# Patient Record
Sex: FEMALE | URBAN - METROPOLITAN AREA
[De-identification: names, ages, dates, MRNs, and addresses within clinical notes are randomized per-mention and may not be internally consistent; named-entity substitution may affect disease eponyms.]

---

## 2017-10-06 ENCOUNTER — CHARTING TRANS (OUTPATIENT)
Dept: URGENT CARE | Age: 22
End: 2017-10-06

## 2017-10-06 ASSESSMENT — PAIN SCALES - GENERAL: PAINLEVEL_OUTOF10: 4

## 2017-12-21 PROCEDURE — 87086 URINE CULTURE/COLONY COUNT: CPT | Performed by: FAMILY MEDICINE

## 2018-01-19 LAB
AMB EXT ANTIBODY SCREEN: NEGATIVE
AMB EXT CHLAMYDIA, NUCLEIC ACID AMP: NOT DETECTED
AMB EXT CYSFIB RESULT: NEGATIVE
AMB EXT GONOCOCCUS, NUCLEIC ACID AMP: NOT DETECTED
AMB EXT HBSAG SCREEN: NON REACTIVE
AMB EXT HEMATOCRIT: 34.9
AMB EXT HEMOGLOBIN: 11.9
AMB EXT HIV AG AB COMBO: NON REACTIVE
AMB EXT MCV: 92.3
AMB EXT PLATELETS: 273
AMB EXT RH FACTOR: POSITIVE
AMB EXT SICKLE CELL SOLUBILITY: NEGATIVE
AMB EXT THINPREP PAP: NEGATIVE
AMB EXT TREPONEMAL ANTIBODIES: NON REACTIVE
AMB EXT URINE CULTURE ROUTINE: NO GROWTH

## 2018-03-09 PROBLEM — M54.50 ACUTE BILATERAL LOW BACK PAIN WITHOUT SCIATICA: Status: ACTIVE | Noted: 2018-03-09

## 2018-05-26 LAB
AMB EXT GLUCOSE 1HR OB: 85
AMB EXT HEMATOCRIT: 36.3
AMB EXT HEMOGLOBIN: 12.7
AMB EXT PLATELETS: 258

## 2018-07-19 ENCOUNTER — TELEPHONE (OUTPATIENT)
Dept: OBGYN CLINIC | Facility: CLINIC | Age: 23
End: 2018-07-19

## 2018-07-19 NOTE — TELEPHONE ENCOUNTER
Spoke w/ pt & offered meet & greet appt w/ BR. Pt explained she was w/ cnm at [facility] when she was transferred to [facility] but was told she could come back to the practice. When she called to say she was returning, they told her they could not accept her.  Pt

## 2018-07-27 ENCOUNTER — OFFICE VISIT (OUTPATIENT)
Dept: OBGYN CLINIC | Facility: CLINIC | Age: 23
End: 2018-07-27
Payer: COMMERCIAL

## 2018-07-27 ENCOUNTER — APPOINTMENT (OUTPATIENT)
Dept: LAB | Facility: HOSPITAL | Age: 23
End: 2018-07-27
Attending: ADVANCED PRACTICE MIDWIFE
Payer: COMMERCIAL

## 2018-07-27 ENCOUNTER — NURSE ONLY (OUTPATIENT)
Dept: OBGYN CLINIC | Facility: CLINIC | Age: 23
End: 2018-07-27
Payer: COMMERCIAL

## 2018-07-27 VITALS — WEIGHT: 173.13 LBS | HEIGHT: 62.25 IN | BODY MASS INDEX: 31.46 KG/M2

## 2018-07-27 VITALS — HEART RATE: 67 BPM | DIASTOLIC BLOOD PRESSURE: 69 MMHG | SYSTOLIC BLOOD PRESSURE: 105 MMHG

## 2018-07-27 DIAGNOSIS — Z71.89 PRENATAL CONSULT: ICD-10-CM

## 2018-07-27 DIAGNOSIS — Z3A.35 35 WEEKS GESTATION OF PREGNANCY: ICD-10-CM

## 2018-07-27 DIAGNOSIS — Z3A.35 35 WEEKS GESTATION OF PREGNANCY: Primary | ICD-10-CM

## 2018-07-27 DIAGNOSIS — Z34.03 ENCOUNTER FOR SUPERVISION OF NORMAL FIRST PREGNANCY IN THIRD TRIMESTER: Primary | ICD-10-CM

## 2018-07-27 LAB
FOLATE SERPL-MCNC: >24 NG/ML
MULTISTIX LOT#: NORMAL NUMERIC
PH, URINE: 7.5 (ref 4.5–8)
SPECIFIC GRAVITY: 1.01 (ref 1–1.03)
URINE-COLOR: YELLOW
UROBILINOGEN,SEMI-QN: 0.2 MG/DL (ref 0–1.9)
VIT B12 SERPL-MCNC: 294 PG/ML (ref 181–914)

## 2018-07-27 PROCEDURE — 86778 TOXOPLASMA ANTIBODY IGM: CPT

## 2018-07-27 PROCEDURE — 86803 HEPATITIS C AB TEST: CPT

## 2018-07-27 PROCEDURE — 81002 URINALYSIS NONAUTO W/O SCOPE: CPT | Performed by: ADVANCED PRACTICE MIDWIFE

## 2018-07-27 PROCEDURE — 87389 HIV-1 AG W/HIV-1&-2 AB AG IA: CPT

## 2018-07-27 PROCEDURE — 36415 COLL VENOUS BLD VENIPUNCTURE: CPT

## 2018-07-27 PROCEDURE — 82746 ASSAY OF FOLIC ACID SERUM: CPT

## 2018-07-27 PROCEDURE — 82607 VITAMIN B-12: CPT

## 2018-07-27 PROCEDURE — 86780 TREPONEMA PALLIDUM: CPT

## 2018-07-27 PROCEDURE — 86777 TOXOPLASMA ANTIBODY: CPT

## 2018-07-27 PROCEDURE — 86787 VARICELLA-ZOSTER ANTIBODY: CPT

## 2018-07-27 NOTE — PROGRESS NOTES
Pt is a Transfer of Care at 35.4 weeks. NOB Education complete and information given to pt. Pt is vegetarian, she is unsure if she ever had chicken pox or if she had the immunization, pt has a cat but does not change the litter box.   Folate and B12, Vari

## 2018-07-28 NOTE — PROGRESS NOTES
HPI:    Patient ID: Paxton Hope is a 20year old female presents for prenatal consult. Was with [facility] [facility] [employer] [facility] then moved to [place].  Plan all along had been to transfer back to them but when she called midwife had left and they could no longer

## 2018-07-29 LAB
TOXOPLASMA GONDII AB, IGG: <3 IU/ML
TOXOPLASMA GONDII AB, IGM: <3 AU/ML

## 2018-07-30 ENCOUNTER — INITIAL PRENATAL (OUTPATIENT)
Dept: OBGYN CLINIC | Facility: CLINIC | Age: 23
End: 2018-07-30
Payer: COMMERCIAL

## 2018-07-30 ENCOUNTER — TELEPHONE (OUTPATIENT)
Dept: OBGYN CLINIC | Facility: CLINIC | Age: 23
End: 2018-07-30

## 2018-07-30 VITALS
DIASTOLIC BLOOD PRESSURE: 73 MMHG | SYSTOLIC BLOOD PRESSURE: 108 MMHG | HEART RATE: 80 BPM | WEIGHT: 174.63 LBS | BODY MASS INDEX: 32 KG/M2

## 2018-07-30 DIAGNOSIS — Z34.03 ENCOUNTER FOR SUPERVISION OF NORMAL FIRST PREGNANCY IN THIRD TRIMESTER: Primary | ICD-10-CM

## 2018-07-30 LAB
APPEARANCE: CLEAR
HCV AB SERPL QL IA: NONREACTIVE
HIV1+2 AB SERPL QL IA: NONREACTIVE
MULTISTIX LOT#: NORMAL NUMERIC
PH, URINE: 6.5 (ref 4.5–8)
SPECIFIC GRAVITY: 1.02 (ref 1–1.03)
T PALLIDUM AB SER QL: NEGATIVE
URINE-COLOR: YELLOW
UROBILINOGEN,SEMI-QN: 0.2 MG/DL (ref 0–1.9)
VZV IGG SER IA-ACNC: 147 (ref 165–?)

## 2018-07-30 PROCEDURE — 81002 URINALYSIS NONAUTO W/O SCOPE: CPT | Performed by: ADVANCED PRACTICE MIDWIFE

## 2018-07-30 NOTE — TELEPHONE ENCOUNTER
----- Message from Brian Heath CNM sent at 7/30/2018 11:31 AM CDT -----  Please notify of normal labs, negative GBS

## 2018-07-30 NOTE — TELEPHONE ENCOUNTER
Spoke with pt and advised of normal lab results and negative GBS. Pt agreed and voiced understanding.

## 2018-07-30 NOTE — PROGRESS NOTES
VIRGILIO at 35 weeks. Feeling well, active baby. Hoping for water birth, reviewed study. Yes to vitamin K, no EES. Thinking about saline lock, would like to avoid. Yes to circumcision.   Still looking for peds in [facility], Cindy Dennis while in hospital.  N

## 2018-07-31 LAB
C TRACH DNA SPEC QL NAA+PROBE: NEGATIVE
N GONORRHOEA DNA SPEC QL NAA+PROBE: NEGATIVE

## 2018-08-06 ENCOUNTER — ROUTINE PRENATAL (OUTPATIENT)
Dept: OBGYN CLINIC | Facility: CLINIC | Age: 23
End: 2018-08-06
Payer: COMMERCIAL

## 2018-08-06 VITALS
HEART RATE: 69 BPM | DIASTOLIC BLOOD PRESSURE: 72 MMHG | SYSTOLIC BLOOD PRESSURE: 113 MMHG | BODY MASS INDEX: 32 KG/M2 | WEIGHT: 176 LBS

## 2018-08-06 DIAGNOSIS — Z34.03 ENCOUNTER FOR SUPERVISION OF NORMAL FIRST PREGNANCY IN THIRD TRIMESTER: Primary | ICD-10-CM

## 2018-08-06 LAB
APPEARANCE: CLEAR
MULTISTIX LOT#: NORMAL NUMERIC
PH, URINE: 7 (ref 4.5–8)
SPECIFIC GRAVITY: 1.02 (ref 1–1.03)
URINE-COLOR: YELLOW
UROBILINOGEN,SEMI-QN: 0 MG/DL (ref 0–1.9)

## 2018-08-06 PROCEDURE — 81002 URINALYSIS NONAUTO W/O SCOPE: CPT | Performed by: ADVANCED PRACTICE MIDWIFE

## 2018-08-06 NOTE — PROGRESS NOTES
Active baby. No signs of labor. Reviewed danger signs and CNM contact info. Uncertain about hepatitis B vaccine - encouraged.

## 2018-08-14 ENCOUNTER — ROUTINE PRENATAL (OUTPATIENT)
Dept: OBGYN CLINIC | Facility: CLINIC | Age: 23
End: 2018-08-14
Payer: COMMERCIAL

## 2018-08-14 ENCOUNTER — HOSPITAL ENCOUNTER (INPATIENT)
Facility: HOSPITAL | Age: 23
LOS: 3 days | Discharge: HOME | End: 2018-08-17
Attending: ADVANCED PRACTICE MIDWIFE | Admitting: OBSTETRICS & GYNECOLOGY
Payer: COMMERCIAL

## 2018-08-14 VITALS
SYSTOLIC BLOOD PRESSURE: 117 MMHG | BODY MASS INDEX: 33 KG/M2 | WEIGHT: 180.63 LBS | DIASTOLIC BLOOD PRESSURE: 77 MMHG | HEART RATE: 80 BPM

## 2018-08-14 DIAGNOSIS — Z34.03 ENCOUNTER FOR SUPERVISION OF NORMAL FIRST PREGNANCY IN THIRD TRIMESTER: Primary | ICD-10-CM

## 2018-08-14 PROBLEM — O42.90 PROM (PREMATURE RUPTURE OF MEMBRANES): Status: ACTIVE | Noted: 2018-08-14

## 2018-08-14 PROBLEM — O42.90 ROM (RUPTURE OF MEMBRANES), PREMATURE: Status: ACTIVE | Noted: 2018-08-14

## 2018-08-14 LAB
ANTIBODY SCREEN: NEGATIVE
APPEARANCE: CLEAR
ERYTHROCYTE [DISTWIDTH] IN BLOOD BY AUTOMATED COUNT: 13.1 % (ref 11–15)
HCT VFR BLD AUTO: 36.5 % (ref 35–48)
HGB BLD-MCNC: 12.4 G/DL (ref 12–16)
MCH RBC QN AUTO: 31.8 PG (ref 27–32)
MCHC RBC AUTO-ENTMCNC: 34.1 G/DL (ref 32–37)
MCV RBC AUTO: 93.3 FL (ref 80–100)
MULTISTIX LOT#: NORMAL NUMERIC
PH, URINE: 7.5 (ref 4.5–8)
PLATELET # BLD AUTO: 236 K/UL (ref 140–400)
PMV BLD AUTO: 9 FL (ref 7.4–10.3)
RBC # BLD AUTO: 3.91 M/UL (ref 3.7–5.4)
RH BLOOD TYPE: POSITIVE
SPECIFIC GRAVITY: 1.01 (ref 1–1.03)
URINE-COLOR: YELLOW
UROBILINOGEN,SEMI-QN: 0.2 MG/DL (ref 0–1.9)
WBC # BLD AUTO: 13.7 K/UL (ref 4–11)

## 2018-08-14 PROCEDURE — 81002 URINALYSIS NONAUTO W/O SCOPE: CPT | Performed by: ADVANCED PRACTICE MIDWIFE

## 2018-08-14 PROCEDURE — 59425 ANTEPARTUM CARE ONLY: CPT | Performed by: ADVANCED PRACTICE MIDWIFE

## 2018-08-14 RX ORDER — LIDOCAINE HYDROCHLORIDE 10 MG/ML
30 INJECTION, SOLUTION EPIDURAL; INFILTRATION; INTRACAUDAL; PERINEURAL ONCE
Status: COMPLETED | OUTPATIENT
Start: 2018-08-14 | End: 2018-08-15

## 2018-08-14 RX ORDER — SODIUM CHLORIDE 0.9 % (FLUSH) 0.9 %
10 SYRINGE (ML) INJECTION AS NEEDED
Status: DISCONTINUED | OUTPATIENT
Start: 2018-08-14 | End: 2018-08-15

## 2018-08-14 RX ORDER — AMMONIA INHALANTS 0.04 G/.3ML
0.3 INHALANT RESPIRATORY (INHALATION) AS NEEDED
Status: DISCONTINUED | OUTPATIENT
Start: 2018-08-14 | End: 2018-08-15

## 2018-08-14 RX ORDER — DEXTROSE, SODIUM CHLORIDE, SODIUM LACTATE, POTASSIUM CHLORIDE, AND CALCIUM CHLORIDE 5; .6; .31; .03; .02 G/100ML; G/100ML; G/100ML; G/100ML; G/100ML
INJECTION, SOLUTION INTRAVENOUS
Status: COMPLETED
Start: 2018-08-14 | End: 2018-08-14

## 2018-08-14 RX ORDER — DEXTROSE, SODIUM CHLORIDE, SODIUM LACTATE, POTASSIUM CHLORIDE, AND CALCIUM CHLORIDE 5; .6; .31; .03; .02 G/100ML; G/100ML; G/100ML; G/100ML; G/100ML
125 INJECTION, SOLUTION INTRAVENOUS CONTINUOUS
Status: DISCONTINUED | OUTPATIENT
Start: 2018-08-14 | End: 2018-08-15

## 2018-08-14 RX ORDER — TRISODIUM CITRATE DIHYDRATE AND CITRIC ACID MONOHYDRATE 500; 334 MG/5ML; MG/5ML
30 SOLUTION ORAL AS NEEDED
Status: DISCONTINUED | OUTPATIENT
Start: 2018-08-14 | End: 2018-08-15

## 2018-08-14 RX ORDER — TERBUTALINE SULFATE 1 MG/ML
0.25 INJECTION, SOLUTION SUBCUTANEOUS AS NEEDED
Status: DISCONTINUED | OUTPATIENT
Start: 2018-08-14 | End: 2018-08-15

## 2018-08-14 RX ORDER — IBUPROFEN 600 MG/1
600 TABLET ORAL ONCE AS NEEDED
Status: DISCONTINUED | OUTPATIENT
Start: 2018-08-14 | End: 2018-08-15

## 2018-08-14 NOTE — H&P
[facility] Patient Status:  Inpatient    1995 MRN [id number]   Location [address] [place] [address] Attending Aaron Anne, [address] Day # 0 PCP Nancy Rivers MD Tab Take by mouth.  Disp:  Rfl:  8/13/2018 at 0900       Review of Systems:   As documented in HPI        Physical Exam:   Temp:  [98.1 °F (36.7 °C)] 98.1 °F (36.7 °C)  Pulse:  [77-97] 77  Resp:  [16] 16  BP: (108-118)/(69-78) 118/71    Constitutional: aler All questions answered, all appropriate consents will be signed at the Hospital. Admission is planned for today. Intervention: IV Pitocin induction.     Dalila Hawkins  8/14/2018  3:28 PM

## 2018-08-15 ENCOUNTER — ANESTHESIA (OUTPATIENT)
Dept: OBGYN UNIT | Facility: HOSPITAL | Age: 23
End: 2018-08-15
Payer: COMMERCIAL

## 2018-08-15 ENCOUNTER — ANESTHESIA EVENT (OUTPATIENT)
Dept: OBGYN UNIT | Facility: HOSPITAL | Age: 23
End: 2018-08-15
Payer: COMMERCIAL

## 2018-08-15 PROBLEM — O41.1230 CHORIOAMNIONITIS IN THIRD TRIMESTER: Status: ACTIVE | Noted: 2018-08-15

## 2018-08-15 PROCEDURE — 4A1H74Z MONITORING OF PRODUCTS OF CONCEPTION, CARDIAC ELECTRICAL ACTIVITY, VIA NATURAL OR ARTIFICIAL OPENING: ICD-10-PCS | Performed by: ADVANCED PRACTICE MIDWIFE

## 2018-08-15 PROCEDURE — 10H07YZ INSERTION OF OTHER DEVICE INTO PRODUCTS OF CONCEPTION, VIA NATURAL OR ARTIFICIAL OPENING: ICD-10-PCS | Performed by: ADVANCED PRACTICE MIDWIFE

## 2018-08-15 PROCEDURE — 59410 OBSTETRICAL CARE: CPT | Performed by: ADVANCED PRACTICE MIDWIFE

## 2018-08-15 PROCEDURE — 0HQ9XZZ REPAIR PERINEUM SKIN, EXTERNAL APPROACH: ICD-10-PCS | Performed by: ADVANCED PRACTICE MIDWIFE

## 2018-08-15 RX ORDER — BISACODYL 10 MG
10 SUPPOSITORY, RECTAL RECTAL ONCE AS NEEDED
Status: DISCONTINUED | OUTPATIENT
Start: 2018-08-15 | End: 2018-08-18

## 2018-08-15 RX ORDER — LIDOCAINE HYDROCHLORIDE 10 MG/ML
INJECTION, SOLUTION EPIDURAL; INFILTRATION; INTRACAUDAL; PERINEURAL
Status: DISPENSED
Start: 2018-08-15 | End: 2018-08-16

## 2018-08-15 RX ORDER — AMMONIA INHALANTS 0.04 G/.3ML
0.3 INHALANT RESPIRATORY (INHALATION) AS NEEDED
Status: DISCONTINUED | OUTPATIENT
Start: 2018-08-15 | End: 2018-08-18

## 2018-08-15 RX ORDER — DOCUSATE SODIUM 100 MG/1
100 CAPSULE, LIQUID FILLED ORAL 2 TIMES DAILY
Status: DISCONTINUED | OUTPATIENT
Start: 2018-08-15 | End: 2018-08-18

## 2018-08-15 RX ORDER — BUPIVACAINE HYDROCHLORIDE 2.5 MG/ML
INJECTION, SOLUTION EPIDURAL; INFILTRATION; INTRACAUDAL
Status: COMPLETED
Start: 2018-08-15 | End: 2018-08-15

## 2018-08-15 RX ORDER — ONDANSETRON 2 MG/ML
4 INJECTION INTRAMUSCULAR; INTRAVENOUS EVERY 6 HOURS PRN
Status: DISCONTINUED | OUTPATIENT
Start: 2018-08-15 | End: 2018-08-18

## 2018-08-15 RX ORDER — IBUPROFEN 200 MG
400 TABLET ORAL EVERY 4 HOURS PRN
Status: DISCONTINUED | OUTPATIENT
Start: 2018-08-15 | End: 2018-08-18

## 2018-08-15 RX ORDER — 0.9 % SODIUM CHLORIDE 0.9 %
VIAL (ML) INJECTION
Status: DISCONTINUED
Start: 2018-08-15 | End: 2018-08-15 | Stop reason: WASHOUT

## 2018-08-15 RX ORDER — SODIUM CHLORIDE, SODIUM LACTATE, POTASSIUM CHLORIDE, CALCIUM CHLORIDE 600; 310; 30; 20 MG/100ML; MG/100ML; MG/100ML; MG/100ML
INJECTION, SOLUTION INTRAVENOUS
Status: COMPLETED
Start: 2018-08-15 | End: 2018-08-15

## 2018-08-15 RX ORDER — GENTAMICIN SULFATE 80 MG/50ML
1.5 INJECTION, SOLUTION INTRAVENOUS EVERY 8 HOURS
Status: DISCONTINUED | OUTPATIENT
Start: 2018-08-15 | End: 2018-08-15

## 2018-08-15 RX ORDER — ONDANSETRON 2 MG/ML
4 INJECTION INTRAMUSCULAR; INTRAVENOUS ONCE
Status: COMPLETED | OUTPATIENT
Start: 2018-08-15 | End: 2018-08-15

## 2018-08-15 RX ORDER — NALBUPHINE HCL 10 MG/ML
2.5 AMPUL (ML) INJECTION
Status: DISCONTINUED | OUTPATIENT
Start: 2018-08-15 | End: 2018-08-15

## 2018-08-15 RX ORDER — IBUPROFEN 200 MG
200 TABLET ORAL EVERY 4 HOURS PRN
Status: DISCONTINUED | OUTPATIENT
Start: 2018-08-15 | End: 2018-08-18

## 2018-08-15 RX ORDER — ONDANSETRON 2 MG/ML
INJECTION INTRAMUSCULAR; INTRAVENOUS
Status: DISPENSED
Start: 2018-08-15 | End: 2018-08-16

## 2018-08-15 RX ORDER — EPHEDRINE SULFATE/0.9% NACL/PF 25 MG/5 ML
SYRINGE (ML) INTRAVENOUS
Status: DISCONTINUED
Start: 2018-08-15 | End: 2018-08-15 | Stop reason: WASHOUT

## 2018-08-15 RX ORDER — ACETAMINOPHEN 325 MG/1
650 TABLET ORAL ONCE
Status: COMPLETED | OUTPATIENT
Start: 2018-08-15 | End: 2018-08-15

## 2018-08-15 RX ORDER — PRENATAL VIT,CAL 76/IRON/FOLIC 29 MG-1 MG
1 TABLET ORAL DAILY
Status: DISCONTINUED | OUTPATIENT
Start: 2018-08-16 | End: 2018-08-18

## 2018-08-15 RX ORDER — BUPIVACAINE HYDROCHLORIDE 2.5 MG/ML
INJECTION, SOLUTION EPIDURAL; INFILTRATION; INTRACAUDAL AS NEEDED
Status: DISCONTINUED | OUTPATIENT
Start: 2018-08-15 | End: 2018-08-15 | Stop reason: SURG

## 2018-08-15 RX ORDER — IBUPROFEN 600 MG/1
600 TABLET ORAL EVERY 4 HOURS PRN
Status: DISCONTINUED | OUTPATIENT
Start: 2018-08-15 | End: 2018-08-18

## 2018-08-15 RX ORDER — SIMETHICONE 80 MG
80 TABLET,CHEWABLE ORAL 3 TIMES DAILY PRN
Status: DISCONTINUED | OUTPATIENT
Start: 2018-08-15 | End: 2018-08-18

## 2018-08-15 RX ORDER — DIAPER,BRIEF,INFANT-TODD,DISP
1 EACH MISCELLANEOUS EVERY 6 HOURS PRN
Status: DISCONTINUED | OUTPATIENT
Start: 2018-08-15 | End: 2018-08-18

## 2018-08-15 RX ORDER — EPHEDRINE SULFATE/0.9% NACL/PF 25 MG/5 ML
5 SYRINGE (ML) INTRAVENOUS AS NEEDED
Status: DISCONTINUED | OUTPATIENT
Start: 2018-08-15 | End: 2018-08-15

## 2018-08-15 RX ADMIN — BUPIVACAINE HYDROCHLORIDE 10 ML: 2.5 INJECTION, SOLUTION EPIDURAL; INFILTRATION; INTRACAUDAL at 16:04:00

## 2018-08-15 NOTE — PROGRESS NOTES
[facility]    Labor Progress Note    Ursula Dey Patient Status:  Inpatient    1995 MRN [id number]   Location [address]  Attending Leah Cast, [address] Day # 1 PCP Kelly Cedeno MD

## 2018-08-15 NOTE — PROGRESS NOTES
[place] [facility]      Labor Progress Note    Bryson Smith Patient Status:  Inpatient    1995 MRN [id number]   Location [address]  Attending Sada Early, [address] Day # 1 PCP Luz Maria Chicas MD

## 2018-08-15 NOTE — ANESTHESIA PROCEDURE NOTES
Labor Analgesia  Performed by: Yesenia Gaffney  Authorized by: Yesenia Gaffney     Patient Location:  OB  Start Time:  8/15/2018 3:55 PM  End Time:  8/15/2018 4:05 PM  Site Identification: surface landmarks    Anesthesiologist:  Yesenia Gaffney

## 2018-08-15 NOTE — PROGRESS NOTES
[facility]    Labor Progress Note    Dulcy Bring Patient Status:  Inpatient    1995 MRN [id number]   Location [address] [facility] Attending Terry Mary, [address] Day # 1 PCP Lia Palomo MD understanding and agreement.         Mony Goldsmith  8/15/2018

## 2018-08-15 NOTE — PROGRESS NOTES
Reported feeling leaking fluid when got up this morning, soaked underwear. No further fluid since. Had intercourse last night. Denies bleeding or cramping.     SSE: pooling watery mucous, +nitrazine, - ferning x1, to [facility] now for amniosure test.  VALENTÍN coleman

## 2018-08-15 NOTE — PROGRESS NOTES
[facility]      Labor Progress Note    Bryson Smith Patient Status:  Inpatient    1995 MRN [id number]   Location [address]  Attending Sada Early, [address] Day # 1 PCP Luz Maria Chicas MD

## 2018-08-15 NOTE — PROGRESS NOTES
Received bedside report from Francia Morrison RN. Pt. In shower, intermittent monitor, with saline lock intact.

## 2018-08-15 NOTE — PROGRESS NOTES
[facility]    Labor Progress Note    Maxine Abernathy Patient Status:  Inpatient    1995 MRN [id number]   Location [address] [facility] Attending Tomer Montalvo, [address] Day # 1 PCP Aurora Gonzalez MD understanding and agreement.         Carrol Small  8/15/2018

## 2018-08-15 NOTE — ANESTHESIA PREPROCEDURE EVALUATION
Anesthesia PreOp Note    HPI:     Lupe Ramos is a 20year old female who presents for preoperative consultation requested by: * No surgeons listed *    Date of Surgery: 8/15/2018    * No procedures listed *  Indication: * No pre-op diagnosis entered Rate: 125 mL/hr at 08/15/18 0225 125 mL/hr at 08/15/18 0225   Lidocaine HCl (PF) (XYLOCAINE) 1 % injection SOLN 30 mL 30 mL Intradermal Once Raj Woods CNM     ibuprofen (MOTRIN) tab 600 mg 600 mg Oral Once PRN Raj Woods CNM     oxyTOC 12.4 08/14/2018   HGB 12.7 05/26/2018   HCT 36.5 08/14/2018   HCT 36.3 05/26/2018   MCV 93.3 08/14/2018   MCH 31.8 08/14/2018   MCHC 34.1 08/14/2018   RDW 13.1 08/14/2018    08/14/2018    05/26/2018   MPV 9.0 08/14/2018             Vital Sign

## 2018-08-15 NOTE — PROGRESS NOTES
[facility]    Labor Progress Note    [place] Patient Status:  Inpatient    1995 MRN [id number]   Location [address] [place] [address] Attending Ned Galvan, [address] Day # 1 PCP Jose Roberto Kim MD SCNM present for management and care of patient under my direct supervision.        Lexi Granger  8/15/2018

## 2018-08-16 LAB
BASOPHILS # BLD: 0 K/UL (ref 0–0.2)
BASOPHILS NFR BLD: 0 %
EOSINOPHIL # BLD: 0.1 K/UL (ref 0–0.7)
EOSINOPHIL NFR BLD: 0 %
ERYTHROCYTE [DISTWIDTH] IN BLOOD BY AUTOMATED COUNT: 13 % (ref 11–15)
HCT VFR BLD AUTO: 32.6 % (ref 35–48)
HGB BLD-MCNC: 10.8 G/DL (ref 12–16)
LYMPHOCYTES # BLD: 2.3 K/UL (ref 1–4)
LYMPHOCYTES NFR BLD: 10 %
MCH RBC QN AUTO: 31.9 PG (ref 27–32)
MCHC RBC AUTO-ENTMCNC: 33.2 G/DL (ref 32–37)
MCV RBC AUTO: 96.2 FL (ref 80–100)
MONOCYTES # BLD: 1.7 K/UL (ref 0–1)
MONOCYTES NFR BLD: 8 %
NEUTROPHILS # BLD AUTO: 18.7 K/UL (ref 1.8–7.7)
NEUTROPHILS NFR BLD: 82 %
PLATELET # BLD AUTO: 205 K/UL (ref 140–400)
PMV BLD AUTO: 8.6 FL (ref 7.4–10.3)
RBC # BLD AUTO: 3.39 M/UL (ref 3.7–5.4)
WBC # BLD AUTO: 22.8 K/UL (ref 4–11)

## 2018-08-16 RX ORDER — DEXTROSE, SODIUM CHLORIDE, SODIUM LACTATE, POTASSIUM CHLORIDE, AND CALCIUM CHLORIDE 5; .6; .31; .03; .02 G/100ML; G/100ML; G/100ML; G/100ML; G/100ML
INJECTION, SOLUTION INTRAVENOUS
Status: DISPENSED
Start: 2018-08-16 | End: 2018-08-16

## 2018-08-16 NOTE — PROGRESS NOTES
PT TRANSFERRED TO PP UNIT VIA WHEELCHAIR HOLDING , BOTH IN STABLE CONDITION. SIGNIFICANT OTHER PRESENT CARRYING BELONGINGS.  UPON ARRIVAL TO ROOM 351 PT WAS ASSISTED TO THE BED, ORIENTED TO THE ENVIRONMENT, CALL LIGHT PLACED WITHIN REACH, AND INSTRUC

## 2018-08-16 NOTE — ANESTHESIA POSTPROCEDURE EVALUATION
Patient: [facility]    Procedure Summary     Date:  08/15/18 Room / Location:      Anesthesia Start:  4234 Anesthesia Stop:  2200    Procedure:  LABOR ANALGESIA Diagnosis:      Scheduled Providers:   Anesthesiologist:  Franco Payne MD    Ane

## 2018-08-16 NOTE — PROGRESS NOTES
[place] [facility] - [facility]    OB/GYNE Progress Note      Renetta Haas Patient Status:  Inpatient    1995 MRN [id number]   Location [facility] 3SE Attending Rosangela Phelps, [address] Day # 2 PCP Rhianna Abarca MD        Assess Results  Component Value Date   B12 294 07/27/2018   SPECGRAVITY 1.010 08/14/2018   GLUUR neg 08/14/2018   NITRITE neg 08/14/2018                   Richie Kohler CNM  8/16/2018  10:53 AM

## 2018-08-16 NOTE — PLAN OF CARE
Sat with patient to review plan of care. Discussed analgesic options and answered all questions. VSS. Breastfeeding on demand. Breastfeeding successfully. Pt. Enrique Handing to call when she needs to void, needs RN assistance. Lochia is WNL. Uterus is firm.

## 2018-08-16 NOTE — LACTATION NOTE
This note was copied from a baby's chart. LACTATION NOTE - INFANT    Evaluation Type  Evaluation Type: Inpatient    Problems & Assessment  Muscle tone: Appropriate for GA    Feeding Assessment  Summary Current Feeding: Breastfeeding exclusively; Adlib  Mandy

## 2018-08-16 NOTE — PROGRESS NOTES
[facility]    Labor Progress Note    Dulcy Bring Patient Status:  Inpatient    1995 MRN [id number]   Location [address]  Attending Terry Mary, [address] [facility] Day # 1 PCP Lia Palomo MD verbalizes understanding and agreement.         Lexi Granger  8/15/2018

## 2018-08-16 NOTE — PROGRESS NOTES
Received pt in  351 at [place] [address]. Pt transferred via [facility]. VS and assessment WNL. Oriented to room. POC reviewed. Instructed to call for assistance when ready to void. Report received from BRIAN Valdivia.

## 2018-08-17 VITALS
RESPIRATION RATE: 16 BRPM | SYSTOLIC BLOOD PRESSURE: 103 MMHG | OXYGEN SATURATION: 98 % | HEIGHT: 62 IN | DIASTOLIC BLOOD PRESSURE: 57 MMHG | BODY MASS INDEX: 32.76 KG/M2 | WEIGHT: 178 LBS | TEMPERATURE: 98 F | HEART RATE: 78 BPM

## 2018-08-17 NOTE — LACTATION NOTE
LACTATION NOTE - MOTHER      Evaluation Type: Inpatient    Problems identified  Problems identified: Knowledge deficit;Milk supply WNL         Breastfeeding goal  Breastfeeding goal: To maintain breast milk feeding per patient goal    Maternal Assessment

## 2018-08-17 NOTE — PROGRESS NOTES
[place] [facility] - [facility]    OB/GYNE Progress Note      Jorden Douglass Patient Status:  Inpatient    1995 MRN    Location [facility] 3SE Attending [address] [place] [address], [address] Day #  PCP Belen Womack MD           HPI:   Zoe Tobar Denies pain, reports nipples are fine, breastfeeding successfully  GI: denies abdominal pain,denies heartburn, denies constipation or diarrhea, +BM  : denies dysuria, pelvic pain, heavy lochia or clots  PERINEUM: without pain  MUSCULOSKELETAL: denies silvia

## 2018-08-17 NOTE — DISCHARGE SUMMARY
[place] [facility] - [facility]    Discharge Summary    Florentino Prasad Patient Status:  Inpatient    1995 MRN [id number]   Location [facility] 3SE Attending Luli Blakely, [address] Day # 3       Delivering OB Clinician: Gerber Brooke

## 2018-08-18 NOTE — PROGRESS NOTES
Mom was discharged all paper work was reviewed with patient. Mom will still room in with baby because baby did not get discharged.

## 2018-08-31 ENCOUNTER — TELEPHONE (OUTPATIENT)
Dept: OBGYN CLINIC | Facility: CLINIC | Age: 23
End: 2018-08-31

## 2018-08-31 ENCOUNTER — POSTPARTUM (OUTPATIENT)
Dept: OBGYN CLINIC | Facility: CLINIC | Age: 23
End: 2018-08-31
Payer: COMMERCIAL

## 2018-08-31 VITALS
BODY MASS INDEX: 31 KG/M2 | SYSTOLIC BLOOD PRESSURE: 104 MMHG | HEART RATE: 72 BPM | DIASTOLIC BLOOD PRESSURE: 77 MMHG | WEIGHT: 169 LBS

## 2018-08-31 NOTE — TELEPHONE ENCOUNTER
LMTCB. Requested pt call back for the information on the IUD codes for her insurance.   IUD insertion CPT code is [phone]  The supply code for the Cheryl Womackstein IUD is   The supply code for the Paragard is [phone] [id number]

## 2018-08-31 NOTE — PROGRESS NOTES
Pt is 2 wks PP s/p  of viable male infant. Breastfeeding successfully with infant with adequate weight gain. Denies any S&S of PP depression. Bleeding decreasing. Adequate family support. Denies any abuse.  Reports pulling sensation in labia when si

## 2018-09-20 ENCOUNTER — TELEPHONE (OUTPATIENT)
Dept: OBGYN CLINIC | Facility: CLINIC | Age: 23
End: 2018-09-20

## 2018-09-20 NOTE — TELEPHONE ENCOUNTER
Unsure if wants Hoda Stakes or paragard iud but will speak w/ BR at appt to make final decision.  Pt verbalized an understanding & agrees w/ plan

## 2018-09-27 ENCOUNTER — OFFICE VISIT (OUTPATIENT)
Dept: OBGYN CLINIC | Facility: CLINIC | Age: 23
End: 2018-09-27
Payer: COMMERCIAL

## 2018-09-27 VITALS
WEIGHT: 177 LBS | HEART RATE: 80 BPM | SYSTOLIC BLOOD PRESSURE: 111 MMHG | BODY MASS INDEX: 32 KG/M2 | DIASTOLIC BLOOD PRESSURE: 72 MMHG

## 2018-09-27 DIAGNOSIS — Z30.430 ENCOUNTER FOR INSERTION OF INTRAUTERINE CONTRACEPTIVE DEVICE: Primary | ICD-10-CM

## 2018-09-27 DIAGNOSIS — Z30.09 FAMILY PLANNING: ICD-10-CM

## 2018-09-27 LAB
CONTROL LINE PRESENT WITH A CLEAR BACKGROUND (YES/NO): YES YES/NO
KIT LOT #: 0 NUMERIC
PREGNANCY TEST, URINE: NEGATIVE

## 2018-09-27 PROCEDURE — 81025 URINE PREGNANCY TEST: CPT | Performed by: ADVANCED PRACTICE MIDWIFE

## 2018-09-27 PROCEDURE — 58300 INSERT INTRAUTERINE DEVICE: CPT | Performed by: ADVANCED PRACTICE MIDWIFE

## 2018-09-28 PROBLEM — O42.90 PROM (PREMATURE RUPTURE OF MEMBRANES): Status: RESOLVED | Noted: 2018-08-14 | Resolved: 2018-09-28

## 2018-09-28 PROBLEM — O41.1230 CHORIOAMNIONITIS IN THIRD TRIMESTER: Status: RESOLVED | Noted: 2018-08-15 | Resolved: 2018-09-28

## 2018-09-28 PROBLEM — O42.90 ROM (RUPTURE OF MEMBRANES), PREMATURE: Status: RESOLVED | Noted: 2018-08-14 | Resolved: 2018-09-28

## 2018-09-28 PROBLEM — M54.50 ACUTE BILATERAL LOW BACK PAIN WITHOUT SCIATICA: Status: RESOLVED | Noted: 2018-03-09 | Resolved: 2018-09-28

## 2018-09-28 NOTE — PROCEDURES
IUD Insertion     Pregnancy Results: negative from urine  Birth control method(s) used: abstinence since delivery  Consent signed. Procedure discussed with the patient in detail including indication, risks, benefits, alternatives and complications.     Pel

## 2018-09-28 NOTE — PROGRESS NOTES
HPI:   Christina Muller is a 20year old  who presents for a 6 week Postpartum visit. Pt accompanied by  and baby Reports adapting well and coping well. Breastfeeding successfully. No strain in relationship, partner supportive. Denies DV. hx:          REVIEW OF SYSTEMS:     GENERAL: feels well, NAD, denies extreme fatigue  CARDIOVASCULAR: denies chest pain on exertion or palpitations  BREAST:  Reports milk supply well established, denies nipple pain, cracking or bleeding, denies mastitis sy

## 2018-11-28 VITALS
DIASTOLIC BLOOD PRESSURE: 67 MMHG | HEART RATE: 68 BPM | RESPIRATION RATE: 16 BRPM | SYSTOLIC BLOOD PRESSURE: 110 MMHG | OXYGEN SATURATION: 99 % | TEMPERATURE: 98.4 F

## 2019-03-06 ENCOUNTER — OFFICE VISIT (OUTPATIENT)
Dept: SURGERY | Facility: CLINIC | Age: 24
End: 2019-03-06
Payer: COMMERCIAL

## 2019-03-06 VITALS
WEIGHT: 177 LBS | HEART RATE: 94 BPM | BODY MASS INDEX: 32.57 KG/M2 | DIASTOLIC BLOOD PRESSURE: 70 MMHG | OXYGEN SATURATION: 94 % | HEIGHT: 62 IN | SYSTOLIC BLOOD PRESSURE: 110 MMHG | RESPIRATION RATE: 18 BRPM

## 2019-03-06 DIAGNOSIS — R63.2 INCREASED APPETITE: ICD-10-CM

## 2019-03-06 DIAGNOSIS — R63.5 WEIGHT GAIN: Primary | ICD-10-CM

## 2019-03-06 DIAGNOSIS — E66.9 OBESITY WITH BODY MASS INDEX (BMI) OF 30.0 TO 39.9: ICD-10-CM

## 2019-03-06 PROCEDURE — 99203 OFFICE O/P NEW LOW 30 MIN: CPT | Performed by: INTERNAL MEDICINE

## 2019-03-06 NOTE — PROGRESS NOTES
[facility] Consultation Note       Date of Consult:  3/6/2019    Patient:  Nohemy Wang  :      1995  MRN:      [id number]    Referring Provider: Dr. Merrill ref.  provider found       Chief Complaint:  Patient presents with:  Co Social Needs      Financial resource strain: Not on file      Food insecurity:        Worry: Not on file        Inability: Not on file      Transportation needs:        Medical: Not on file        Non-medical: Not on file    Tobacco Use      Smoking status • Hypertension Maternal Grandmother    • Lipids Maternal Grandmother            Typical Dietary Intake:  Breakfast AM Snack Lunch PM Snack Dinner   Skips, intermittent fasts  Eggs or cereal None Eggs, tortilla, brocolli  Pasta, oat meal Cheese, quesodill rub or gallop, regular rate and rhythm  Abdomen: soft, non-tender; bowel sounds normal; no masses,  no organomegaly and obese  Extremities: extremities normal, atraumatic, no cyanosis or edema  Pulses: 2+ and symmetric  Skin: Skin color, texture, turgor no next visit.     Diagnoses and all orders for this visit:    Weight gain    Increased appetite    Obesity with body mass index (BMI) of 30.0 to 39.9        MARNIE Owens    Co-Signature: Heidy Ngo MD

## 2019-04-09 ENCOUNTER — TELEPHONE (OUTPATIENT)
Dept: INTERNAL MEDICINE CLINIC | Facility: CLINIC | Age: 24
End: 2019-04-09

## 2022-05-27 NOTE — L&D DELIVERY NOTE
Problem: Discharge Planning  Goal: Discharge to home or other facility with appropriate resources  Outcome: Progressing  Flowsheets (Taken 5/26/2022 2575)  Discharge to home or other facility with appropriate resources: Identify barriers to discharge with patient and caregiver     Problem: Safety - Adult  Goal: Free from fall injury  Outcome: Progressing     Problem: ABCDS Injury Assessment  Goal: Absence of physical injury  Outcome: [facility]    Vaginal Delivery Note    Susy Ezequiel Patient Status:  Inpatient    1995 MRN [id number]   Location [address] [facility] Attending Gemini [place], [address] [facility] Day # 1 PCP Cristina Hays MD lidocaine anesthesia. Mom, baby and family bonding well.      Intake/Output   EBL:  200ml      Rocío Caceres [place]   8/15/2018  10:26 PM